# Patient Record
Sex: FEMALE | Race: OTHER | Employment: FULL TIME | URBAN - METROPOLITAN AREA
[De-identification: names, ages, dates, MRNs, and addresses within clinical notes are randomized per-mention and may not be internally consistent; named-entity substitution may affect disease eponyms.]

---

## 2017-01-16 ENCOUNTER — HOSPITAL ENCOUNTER (OUTPATIENT)
Dept: ULTRASOUND IMAGING | Facility: HOSPITAL | Age: 42
Discharge: HOME | End: 2017-01-16
Attending: FAMILY MEDICINE
Payer: COMMERCIAL

## 2017-01-16 DIAGNOSIS — E06.3 HYPOTHYROIDISM DUE TO HASHIMOTO'S THYROIDITIS: ICD-10-CM

## 2017-01-16 DIAGNOSIS — E03.8 HYPOTHYROIDISM DUE TO HASHIMOTO'S THYROIDITIS: ICD-10-CM

## 2017-01-16 PROCEDURE — 76536 US EXAM OF HEAD AND NECK: CPT

## 2017-01-23 ENCOUNTER — TELEPHONE (OUTPATIENT)
Dept: FAMILY MEDICINE CLINIC | Facility: CLINIC | Age: 42
End: 2017-01-23

## 2017-01-27 NOTE — TELEPHONE ENCOUNTER
Contacted [employer] to initiate PA for Saxenda 18 mg/3 ml subcutaneous solution spoke with rep [place] claim now under clinical review response time 24-48 hours.  REF# [id number]

## 2017-01-30 ENCOUNTER — TELEPHONE (OUTPATIENT)
Dept: FAMILY MEDICINE CLINIC | Facility: CLINIC | Age: 42
End: 2017-01-30

## 2017-02-02 ENCOUNTER — OFFICE VISIT (OUTPATIENT)
Dept: FAMILY MEDICINE CLINIC | Facility: CLINIC | Age: 42
End: 2017-02-02

## 2017-02-02 VITALS
TEMPERATURE: 99 F | DIASTOLIC BLOOD PRESSURE: 73 MMHG | SYSTOLIC BLOOD PRESSURE: 108 MMHG | HEART RATE: 80 BPM | BODY MASS INDEX: 30 KG/M2 | WEIGHT: 163 LBS | RESPIRATION RATE: 18 BRPM | HEIGHT: 62 IN

## 2017-02-02 DIAGNOSIS — E66.09 NON MORBID OBESITY DUE TO EXCESS CALORIES: Primary | ICD-10-CM

## 2017-02-02 PROCEDURE — 99213 OFFICE O/P EST LOW 20 MIN: CPT | Performed by: FAMILY MEDICINE

## 2017-02-02 PROCEDURE — 99214 OFFICE O/P EST MOD 30 MIN: CPT | Performed by: FAMILY MEDICINE

## 2017-02-02 RX ORDER — PEN NEEDLE, DIABETIC 30 GX3/16"
1 NEEDLE, DISPOSABLE MISCELLANEOUS DAILY
Qty: 100 EACH | Refills: 3 | Status: SHIPPED | OUTPATIENT
Start: 2017-02-02 | End: 2017-03-20

## 2017-02-02 NOTE — PROGRESS NOTES
Obesity here for saxenda training. Discussed side effects discussed. No fam hx of thyroid ca    Discussed pancreatitis     Instructions for reasons extended were given.   We mistreated use and patient was able to give herself the first injection witho

## 2017-03-20 ENCOUNTER — OFFICE VISIT (OUTPATIENT)
Dept: FAMILY MEDICINE CLINIC | Facility: CLINIC | Age: 42
End: 2017-03-20

## 2017-03-20 VITALS
WEIGHT: 154 LBS | DIASTOLIC BLOOD PRESSURE: 74 MMHG | BODY MASS INDEX: 28 KG/M2 | SYSTOLIC BLOOD PRESSURE: 114 MMHG | HEART RATE: 69 BPM

## 2017-03-20 DIAGNOSIS — E66.09 NON MORBID OBESITY DUE TO EXCESS CALORIES: Primary | ICD-10-CM

## 2017-03-20 PROCEDURE — 99213 OFFICE O/P EST LOW 20 MIN: CPT | Performed by: FAMILY MEDICINE

## 2017-03-20 PROCEDURE — 99212 OFFICE O/P EST SF 10 MIN: CPT | Performed by: FAMILY MEDICINE

## 2017-03-20 RX ORDER — PEN NEEDLE, DIABETIC 30 GX3/16"
1 NEEDLE, DISPOSABLE MISCELLANEOUS DAILY
Qty: 100 EACH | Refills: 3 | Status: SHIPPED | OUTPATIENT
Start: 2017-03-20

## 2017-03-20 RX ORDER — LEVOTHYROXINE SODIUM 0.2 MG/1
1 TABLET ORAL DAILY
Refills: 2 | COMMUNITY
Start: 2017-02-27

## 2017-03-20 NOTE — PROGRESS NOTES
Started saxenda. Lost a lot of weight  Exercising   Calorie restiction. Feeling good   No side effects  No nausea now. Exam  Overweight nad  Ht rrr no m  Lungs clear  abd soft nontender. A/p  1.  Non morbid obesity due to excess calories  Doing we

## 2017-04-24 ENCOUNTER — TELEPHONE (OUTPATIENT)
Dept: FAMILY MEDICINE CLINIC | Facility: CLINIC | Age: 42
End: 2017-04-24

## 2017-04-28 NOTE — TELEPHONE ENCOUNTER
Keon Clements approved for 2 months effective 4/26/2017-6/26/2017; [address] [facility] notified.

## 2022-07-28 NOTE — TELEPHONE ENCOUNTER
Glasses Rx given. Patient requesting an order/appt. For her first Saxenda shot (weight loss injection )  Please advise once order is ready to make appt.

## (undated) NOTE — MR AVS SNAPSHOT
[facility]  [address] [place] [address], [address] [place] [address]  Berto Maistein               Thank you for choosing us for your health care visit with Katherine Pollard. DO Eli.   We are glad to serve you and happy to provide you with this summary Generic drug:  Levothyroxine Sodium   Take by mouth.                 Where to Get Your Medications      These medications were sent to [place] [id number] [address] [facility], [phone], [address] [place] [address]

## (undated) NOTE — MR AVS SNAPSHOT
[facility]  [address] [place] [address], [address] [place] [address]               Thank you for choosing us for your health care visit with Carmen Barry. DO Eli.   We are glad to serve you and happy to provide you with this summary Commonly known as:  SAXENDA           * Liraglutide -Weight Management 18 MG/3ML Sopn   Inject 1 Application into the skin daily. 1.8mg daily sq for 1 week then 2.4 mg sq thereafter. What changed:   You were already taking a medication with the same name,